# Patient Record
Sex: MALE | Race: WHITE | NOT HISPANIC OR LATINO
[De-identification: names, ages, dates, MRNs, and addresses within clinical notes are randomized per-mention and may not be internally consistent; named-entity substitution may affect disease eponyms.]

---

## 2019-05-23 ENCOUNTER — APPOINTMENT (OUTPATIENT)
Dept: PEDIATRICS | Facility: CLINIC | Age: 19
End: 2019-05-23
Payer: COMMERCIAL

## 2019-05-23 VITALS
WEIGHT: 183 LBS | SYSTOLIC BLOOD PRESSURE: 132 MMHG | TEMPERATURE: 98.7 F | HEIGHT: 70.5 IN | HEART RATE: 103 BPM | DIASTOLIC BLOOD PRESSURE: 75 MMHG | BODY MASS INDEX: 25.91 KG/M2

## 2019-05-23 DIAGNOSIS — J06.9 ACUTE UPPER RESPIRATORY INFECTION, UNSPECIFIED: ICD-10-CM

## 2019-05-23 DIAGNOSIS — J32.9 CHRONIC SINUSITIS, UNSPECIFIED: ICD-10-CM

## 2019-05-23 DIAGNOSIS — H10.33 UNSPECIFIED ACUTE CONJUNCTIVITIS, BILATERAL: ICD-10-CM

## 2019-05-23 PROCEDURE — 99214 OFFICE O/P EST MOD 30 MIN: CPT

## 2019-05-23 RX ORDER — SULFACETAMIDE SODIUM, SULFUR 100; 50 MG/G; MG/G
10-5 LIQUID TOPICAL
Qty: 170 | Refills: 0 | Status: DISCONTINUED | COMMUNITY
Start: 2019-03-05

## 2019-05-23 RX ORDER — AMOXICILLIN AND CLAVULANATE POTASSIUM 875; 125 MG/1; MG/1
875-125 TABLET, COATED ORAL
Qty: 20 | Refills: 0 | Status: COMPLETED | COMMUNITY
Start: 2019-05-23 | End: 2019-06-02

## 2019-05-23 RX ORDER — PREDNISONE 20 MG/1
20 TABLET ORAL
Qty: 5 | Refills: 0 | Status: DISCONTINUED | COMMUNITY
Start: 2019-05-22

## 2019-05-23 RX ORDER — MINOCYCLINE HYDROCHLORIDE 100 MG/1
100 TABLET ORAL
Qty: 60 | Refills: 0 | Status: DISCONTINUED | COMMUNITY
Start: 2019-01-28

## 2019-05-23 NOTE — PHYSICAL EXAM
[No Acute Distress] : no acute distress [Alert] : alert [Tired appearing] : tired appearing [Conjunctiva Injected] : conjunctiva injected  [Discharge] : discharge [Clear] : left tympanic membrane clear [Erythema] : erythema [Purulent Effusion] : purulent effusion [Erythematous Oropharynx] : erythematous oropharynx [Nontender Cervical Lymph Nodes] : nontender cervical lymph nodes [Clear to Ausculatation Bilaterally] : clear to auscultation bilaterally [Soft] : soft [NonTender] : non tender [Non Distended] : non distended [No Hepatosplenomegaly] : no hepatosplenomegaly [NL] : warm [de-identified] : purulent post-nasal drip

## 2019-05-23 NOTE — DISCUSSION/SUMMARY
[FreeTextEntry1] : continue albuterol tid x 1 week\par start antiobiotic\par advil prn\par r/c 1 week if not improving

## 2019-05-23 NOTE — REVIEW OF SYSTEMS
[Fever] : fever [Chills] : chills [Malaise] : malaise [Headache] : headache [Eye Discharge] : eye discharge [Eye Redness] : eye redness [Wheezing] : wheezing [Cough] : cough [Negative] : Genitourinary

## 2019-06-04 ENCOUNTER — TRANSCRIPTION ENCOUNTER (OUTPATIENT)
Age: 19
End: 2019-06-04

## 2019-06-15 ENCOUNTER — TRANSCRIPTION ENCOUNTER (OUTPATIENT)
Age: 19
End: 2019-06-15

## 2019-06-18 ENCOUNTER — APPOINTMENT (OUTPATIENT)
Dept: PEDIATRICS | Facility: CLINIC | Age: 19
End: 2019-06-18
Payer: COMMERCIAL

## 2019-06-18 VITALS
TEMPERATURE: 98.7 F | HEART RATE: 84 BPM | DIASTOLIC BLOOD PRESSURE: 73 MMHG | WEIGHT: 183.25 LBS | SYSTOLIC BLOOD PRESSURE: 111 MMHG | HEIGHT: 70.5 IN | RESPIRATION RATE: 20 BRPM | BODY MASS INDEX: 25.94 KG/M2

## 2019-06-18 PROCEDURE — 81003 URINALYSIS AUTO W/O SCOPE: CPT | Mod: QW

## 2019-06-18 PROCEDURE — 99395 PREV VISIT EST AGE 18-39: CPT

## 2019-06-18 RX ORDER — MOXIFLOXACIN OPHTHALMIC 5 MG/ML
0.5 SOLUTION/ DROPS OPHTHALMIC 3 TIMES DAILY
Qty: 1 | Refills: 0 | Status: COMPLETED | COMMUNITY
Start: 2019-05-23 | End: 2019-06-18

## 2019-06-18 RX ORDER — ALBUTEROL SULFATE 90 UG/1
108 (90 BASE) INHALANT RESPIRATORY (INHALATION)
Qty: 8 | Refills: 0 | Status: COMPLETED | COMMUNITY
Start: 2019-05-22 | End: 2019-06-18

## 2019-06-25 ENCOUNTER — TRANSCRIPTION ENCOUNTER (OUTPATIENT)
Age: 19
End: 2019-06-25

## 2019-06-26 ENCOUNTER — OTHER (OUTPATIENT)
Age: 19
End: 2019-06-26

## 2019-06-26 ENCOUNTER — RESULT CHARGE (OUTPATIENT)
Age: 19
End: 2019-06-26

## 2020-05-26 ENCOUNTER — APPOINTMENT (OUTPATIENT)
Dept: PEDIATRICS | Facility: CLINIC | Age: 20
End: 2020-05-26
Payer: COMMERCIAL

## 2020-05-26 DIAGNOSIS — U07.1 COVID-19: ICD-10-CM

## 2020-05-26 PROCEDURE — 99441: CPT

## 2020-06-18 PROBLEM — U07.1 COVID-19: Status: ACTIVE | Noted: 2020-05-26

## 2020-08-13 ENCOUNTER — APPOINTMENT (OUTPATIENT)
Dept: PEDIATRICS | Facility: CLINIC | Age: 20
End: 2020-08-13
Payer: COMMERCIAL

## 2020-08-13 VITALS
TEMPERATURE: 97.8 F | HEIGHT: 70.5 IN | WEIGHT: 191.44 LBS | DIASTOLIC BLOOD PRESSURE: 87 MMHG | HEART RATE: 75 BPM | BODY MASS INDEX: 27.1 KG/M2 | SYSTOLIC BLOOD PRESSURE: 136 MMHG

## 2020-08-13 DIAGNOSIS — Z23 ENCOUNTER FOR IMMUNIZATION: ICD-10-CM

## 2020-08-13 LAB
BILIRUB UR QL STRIP: NEGATIVE
CLARITY UR: CLEAR
COLLECTION METHOD: NORMAL
GLUCOSE UR-MCNC: NEGATIVE
HCG UR QL: 0.2 EU/DL
HGB UR QL STRIP.AUTO: NEGATIVE
KETONES UR-MCNC: NEGATIVE
LEUKOCYTE ESTERASE UR QL STRIP: NEGATIVE
NITRITE UR QL STRIP: NEGATIVE
PH UR STRIP: 6
PROT UR STRIP-MCNC: NEGATIVE
SP GR UR STRIP: 1.03

## 2020-08-13 PROCEDURE — 90715 TDAP VACCINE 7 YRS/> IM: CPT

## 2020-08-13 PROCEDURE — 90471 IMMUNIZATION ADMIN: CPT

## 2020-08-13 PROCEDURE — 96127 BRIEF EMOTIONAL/BEHAV ASSMT: CPT

## 2020-08-13 PROCEDURE — 81003 URINALYSIS AUTO W/O SCOPE: CPT | Mod: QW

## 2020-08-13 PROCEDURE — 99395 PREV VISIT EST AGE 18-39: CPT | Mod: 25

## 2020-08-13 PROCEDURE — 96160 PT-FOCUSED HLTH RISK ASSMT: CPT

## 2020-08-13 NOTE — DISCUSSION/SUMMARY
[] : The components of the vaccine(s) to be administered today are listed in the plan of care. The disease(s) for which the vaccine(s) are intended to prevent and the risks have been discussed with the caretaker.  The risks are also included in the appropriate vaccination information statements which have been provided to the patient's caregiver.  The caregiver has given consent to vaccinate. [FreeTextEntry1] : Continue balanced diet with all food groups. Brush teeth twice a day with toothbrush. Recommend visit to dentist. Maintain consistent daily routines and sleep schedule. Personal hygiene, puberty, and sexual health reviewed. Risky behaviors assessed. School discussed. Limit screen time to no more than 2 hours per day. Encourage physical activity.\par Return 1 year for routine well child check.\par \par

## 2020-08-13 NOTE — HISTORY OF PRESENT ILLNESS
[Yes] : Patient goes to dentist yearly [Needs Immunizations] : needs immunizations [Eats meals with family] : eats meals with family [Is permitted and is able to make independent decisions] : Is permitted and is able to make independent decisions [Has family members/adults to turn to for help] : has family members/adults to turn to for help [Grade: ____] : Grade: [unfilled] [Normal Behavior/Attention] : normal behavior/attention [Normal Performance] : normal performance [Normal Homework] : normal homework [Eats regular meals including adequate fruits and vegetables] : eats regular meals including adequate fruits and vegetables [Drinks non-sweetened liquids] : drinks non-sweetened liquids  [Has concerns about body or appearance] : has concerns about body or appearance [Calcium source] : calcium source [Has friends] : has friends [At least 1 hour of physical activity a day] : at least 1 hour of physical activity a day [Screen time (except homework) less than 2 hours a day] : screen time (except homework) less than 2 hours a day [Uses safety belts/safety equipment] : uses safety belts/safety equipment  [Impaired/distracted driving] : impaired/distracted driving [Has peer relationships free of violence] : has peer relationships free of violence [No] : Patient has not had sexual intercourse [Has problems with sleep] : has problems with sleep [Displays self-confidence] : displays self-confidence [Has ways to cope with stress] : has ways to cope with stress [With Teen] : teen [Sleep Concerns] : no sleep concerns [Uses electronic nicotine delivery system] : does not use electronic nicotine delivery system [Has interests/participates in community activities/volunteers] : does not have interests/participates in community activities/volunteers [Exposure to electronic nicotine delivery system] : no exposure to electronic nicotine delivery system [Uses tobacco] : does not use tobacco [Uses drugs] : does not use drugs  [Exposure to tobacco] : no exposure to tobacco [Drinks alcohol] : does not drink alcohol [Exposure to drugs] : no exposure to drugs [Exposure to alcohol] : no exposure to alcohol [Gets depressed, anxious, or irritable/has mood swings] : does not get depressed, anxious, or irritable/has mood swings [Has thought about hurting self or considered suicide] : has not thought about hurting self or considered suicide [FreeTextEntry1] : ANNUAL CHECK UP

## 2020-08-13 NOTE — PHYSICAL EXAM
[Alert] : alert [Normocephalic] : normocephalic [No Acute Distress] : no acute distress [EOMI Bilateral] : EOMI bilateral [Nonerythematous Oropharynx] : nonerythematous oropharynx [Pink Nasal Mucosa] : pink nasal mucosa [Clear tympanic membranes with bony landmarks and light reflex present bilaterally] : clear tympanic membranes with bony landmarks and light reflex present bilaterally  [Clear to Auscultation Bilaterally] : clear to auscultation bilaterally [No Palpable Masses] : no palpable masses [Supple, full passive range of motion] : supple, full passive range of motion [Regular Rate and Rhythm] : regular rate and rhythm [Normal S1, S2 audible] : normal S1, S2 audible [No Murmurs] : no murmurs [+2 Femoral Pulses] : +2 femoral pulses [NonTender] : non tender [Soft] : soft [Normoactive Bowel Sounds] : normoactive bowel sounds [Non Distended] : non distended [No Hepatomegaly] : no hepatomegaly [No Abnormal Lymph Nodes Palpated] : no abnormal lymph nodes palpated [No Splenomegaly] : no splenomegaly [Normal Muscle Tone] : normal muscle tone [No pain or deformities with palpation of bone, muscles, joints] : no pain or deformities with palpation of bone, muscles, joints [No Gait Asymmetry] : no gait asymmetry [Straight] : straight [Cranial Nerves Grossly Intact] : cranial nerves grossly intact [+2 Patella DTR] : +2 patella DTR [No Rash or Lesions] : no rash or lesions

## 2020-12-11 ENCOUNTER — TRANSCRIPTION ENCOUNTER (OUTPATIENT)
Age: 20
End: 2020-12-11

## 2020-12-21 PROBLEM — J06.9 ACUTE URI: Status: RESOLVED | Noted: 2019-05-23 | Resolved: 2020-12-21

## 2021-01-21 DIAGNOSIS — Z11.52 ENCOUNTER FOR SCREENING FOR COVID-19: ICD-10-CM

## 2021-07-08 ENCOUNTER — APPOINTMENT (OUTPATIENT)
Dept: PEDIATRICS | Facility: CLINIC | Age: 21
End: 2021-07-08
Payer: COMMERCIAL

## 2021-07-08 VITALS
DIASTOLIC BLOOD PRESSURE: 78 MMHG | TEMPERATURE: 98.3 F | BODY MASS INDEX: 29.32 KG/M2 | SYSTOLIC BLOOD PRESSURE: 128 MMHG | WEIGHT: 207.13 LBS | HEIGHT: 70.5 IN | HEART RATE: 68 BPM

## 2021-07-08 DIAGNOSIS — Z00.00 ENCOUNTER FOR GENERAL ADULT MEDICAL EXAMINATION W/OUT ABNORMAL FINDINGS: ICD-10-CM

## 2021-07-08 DIAGNOSIS — Z00.129 ENCOUNTER FOR ROUTINE CHILD HEALTH EXAMINATION W/OUT ABNORMAL FINDINGS: ICD-10-CM

## 2021-07-08 LAB
BILIRUB UR QL STRIP: NEGATIVE
CLARITY UR: CLEAR
COLLECTION METHOD: NORMAL
GLUCOSE UR-MCNC: NEGATIVE
HCG UR QL: 0.2 EU/DL
HGB UR QL STRIP.AUTO: NEGATIVE
KETONES UR-MCNC: NEGATIVE
LEUKOCYTE ESTERASE UR QL STRIP: NORMAL
NITRITE UR QL STRIP: NEGATIVE
PH UR STRIP: 6
PROT UR STRIP-MCNC: NEGATIVE
SP GR UR STRIP: 1.02

## 2021-07-08 PROCEDURE — 96160 PT-FOCUSED HLTH RISK ASSMT: CPT | Mod: 59

## 2021-07-08 PROCEDURE — 81003 URINALYSIS AUTO W/O SCOPE: CPT | Mod: NC,QW

## 2021-07-08 PROCEDURE — 99395 PREV VISIT EST AGE 18-39: CPT | Mod: 25

## 2021-07-08 PROCEDURE — 99072 ADDL SUPL MATRL&STAF TM PHE: CPT

## 2021-07-08 PROCEDURE — 96127 BRIEF EMOTIONAL/BEHAV ASSMT: CPT

## 2021-07-08 RX ORDER — BENZONATATE 200 MG/1
200 CAPSULE ORAL
Qty: 21 | Refills: 0 | Status: DISCONTINUED | COMMUNITY
Start: 2019-05-22 | End: 2021-07-08

## 2021-07-08 NOTE — HISTORY OF PRESENT ILLNESS
[Yes] : Patient goes to dentist yearly [Up to date] : Up to date [Eats meals with family] : eats meals with family [Has family members/adults to turn to for help] : has family members/adults to turn to for help [Is permitted and is able to make independent decisions] : Is permitted and is able to make independent decisions [Grade: ____] : Grade: [unfilled] [Normal Performance] : normal performance [Normal Behavior/Attention] : normal behavior/attention [Normal Homework] : normal homework [Eats regular meals including adequate fruits and vegetables] : eats regular meals including adequate fruits and vegetables [Drinks non-sweetened liquids] : drinks non-sweetened liquids  [Calcium source] : calcium source [Has friends] : has friends [At least 1 hour of physical activity a day] : at least 1 hour of physical activity a day [Screen time (except homework) less than 2 hours a day] : screen time (except homework) less than 2 hours a day [Has interests/participates in community activities/volunteers] : has interests/participates in community activities/volunteers. [No] : No cigarette smoke exposure [Uses safety belts/safety equipment] : uses safety belts/safety equipment  [Impaired/distracted driving] : impaired/distracted driving [Has peer relationships free of violence] : has peer relationships free of violence [Has ways to cope with stress] : has ways to cope with stress [Displays self-confidence] : displays self-confidence [Has problems with sleep] : has problems with sleep [With Teen] : teen [Sleep Concerns] : no sleep concerns [Has concerns about body or appearance] : does not have concerns about body or appearance [Uses electronic nicotine delivery system] : does not use electronic nicotine delivery system [Exposure to electronic nicotine delivery system] : no exposure to electronic nicotine delivery system [Uses tobacco] : does not use tobacco [Exposure to tobacco] : no exposure to tobacco [Uses drugs] : does not use drugs  [Exposure to drugs] : no exposure to drugs [Drinks alcohol] : does not drink alcohol [Exposure to alcohol] : no exposure to alcohol [Gets depressed, anxious, or irritable/has mood swings] : does not get depressed, anxious, or irritable/has mood swings [Has thought about hurting self or considered suicide] : has not thought about hurting self or considered suicide [FreeTextEntry1] : Healthy 20 year old/young adult male\par Discussed safety/anticipatory guidance\par Discussed avoiding risk taking behavior\par Discussed healthy lifestyle habits\par Reviewed immunization forecast and discussed need for any vaccines, reviewed side effects and VIS\par Discussed need for routine health maintenance and establishing relationship with adult provider\par Discussed need for routine SBE and gave appropriate handout, disc GYN exam\par f/u: 1 year with adult provider.\par \par Discussed and/or provided information on the following:\par PHYSICAL GROWTH AND DEVELOPMENT: Physical and oral health, body image, healthy eating, physical activity\par SOCIAL AND ACADEMIC COMPETENCE: Connectedness with family, peers, and community; interpersonal relationships; school performance\par EMOTIONAL WELL-BEING: Coping, mood regulation and mental health (depression), sexuality\par RISK REDUCTION: Tobacco, alcohol, or other drugs; pregnancy; STIs, Advice about unprotected sex/birth control\par VIOLENCE AND INJURY PREVENTION: Safety belt and helmet use, driving (graduated license) and substance abuse, guns, interpersonal violence (dating violence), bullying\par PHYSICAL ACTIVITY: Adequate physical activity in organized sports, after-school programs, fun activities; limits on screen time\par

## 2021-10-20 DIAGNOSIS — E86.0 DEHYDRATION: ICD-10-CM

## 2022-12-15 ENCOUNTER — NON-APPOINTMENT (OUTPATIENT)
Age: 22
End: 2022-12-15